# Patient Record
Sex: FEMALE
[De-identification: names, ages, dates, MRNs, and addresses within clinical notes are randomized per-mention and may not be internally consistent; named-entity substitution may affect disease eponyms.]

---

## 2017-02-14 ENCOUNTER — APPOINTMENT (OUTPATIENT)
Dept: ORTHOPEDIC SURGERY | Facility: CLINIC | Age: 37
End: 2017-02-14

## 2017-02-14 VITALS — WEIGHT: 160 LBS | BODY MASS INDEX: 23.7 KG/M2 | HEIGHT: 69 IN

## 2017-02-14 DIAGNOSIS — Z87.09 PERSONAL HISTORY OF OTHER DISEASES OF THE RESPIRATORY SYSTEM: ICD-10-CM

## 2017-02-14 DIAGNOSIS — M94.261 CHONDROMALACIA, RIGHT KNEE: ICD-10-CM

## 2017-02-14 PROBLEM — Z00.00 ENCOUNTER FOR PREVENTIVE HEALTH EXAMINATION: Status: ACTIVE | Noted: 2017-02-14

## 2018-03-23 ENCOUNTER — RESULT REVIEW (OUTPATIENT)
Age: 38
End: 2018-03-23

## 2019-02-12 ENCOUNTER — RECORD ABSTRACTING (OUTPATIENT)
Age: 39
End: 2019-02-12

## 2019-02-12 ENCOUNTER — APPOINTMENT (OUTPATIENT)
Dept: INTERNAL MEDICINE | Facility: CLINIC | Age: 39
End: 2019-02-12
Payer: MEDICARE

## 2019-02-12 VITALS
SYSTOLIC BLOOD PRESSURE: 126 MMHG | BODY MASS INDEX: 27.06 KG/M2 | DIASTOLIC BLOOD PRESSURE: 80 MMHG | WEIGHT: 189 LBS | HEIGHT: 70 IN | TEMPERATURE: 98.6 F | HEART RATE: 68 BPM

## 2019-02-12 DIAGNOSIS — Z83.3 FAMILY HISTORY OF DIABETES MELLITUS: ICD-10-CM

## 2019-02-12 DIAGNOSIS — M46.1 SACROILIITIS, NOT ELSEWHERE CLASSIFIED: ICD-10-CM

## 2019-02-12 DIAGNOSIS — M25.561 PAIN IN RIGHT KNEE: ICD-10-CM

## 2019-02-12 DIAGNOSIS — Z80.9 FAMILY HISTORY OF MALIGNANT NEOPLASM, UNSPECIFIED: ICD-10-CM

## 2019-02-12 DIAGNOSIS — Z86.19 PERSONAL HISTORY OF OTHER INFECTIOUS AND PARASITIC DISEASES: ICD-10-CM

## 2019-02-12 DIAGNOSIS — E78.2 MIXED HYPERLIPIDEMIA: ICD-10-CM

## 2019-02-12 DIAGNOSIS — M12.9 ARTHROPATHY, UNSPECIFIED: ICD-10-CM

## 2019-02-12 DIAGNOSIS — Z82.49 FAMILY HISTORY OF ISCHEMIC HEART DISEASE AND OTHER DISEASES OF THE CIRCULATORY SYSTEM: ICD-10-CM

## 2019-02-12 DIAGNOSIS — Z80.1 FAMILY HISTORY OF MALIGNANT NEOPLASM OF TRACHEA, BRONCHUS AND LUNG: ICD-10-CM

## 2019-02-12 DIAGNOSIS — Z78.9 OTHER SPECIFIED HEALTH STATUS: ICD-10-CM

## 2019-02-12 LAB — CYTOLOGY CVX/VAG DOC THIN PREP: NORMAL

## 2019-02-12 PROCEDURE — 99213 OFFICE O/P EST LOW 20 MIN: CPT

## 2019-02-12 RX ORDER — MELOXICAM 15 MG/1
15 TABLET ORAL DAILY
Refills: 0 | Status: ACTIVE | COMMUNITY

## 2019-02-12 NOTE — PHYSICAL EXAM
[No Acute Distress] : no acute distress [Well Nourished] : well nourished [Supple] : supple [No Lymphadenopathy] : no lymphadenopathy [No Respiratory Distress] : no respiratory distress  [Clear to Auscultation] : lungs were clear to auscultation bilaterally [Normal Rate] : normal rate  [Regular Rhythm] : with a regular rhythm [Normal S1, S2] : normal S1 and S2 [No Edema] : there was no peripheral edema [Grossly Normal Strength/Tone] : grossly normal strength/tone [Normal Gait] : normal gait [No Focal Deficits] : no focal deficits [de-identified] : L SI joint tenderness [de-identified] : neg straight leg raising bilat

## 2019-02-12 NOTE — REVIEW OF SYSTEMS
[Back Pain] : back pain [Negative] : Heme/Lymph [FreeTextEntry9] : L lower  [de-identified] : no radiating pain  through the leg

## 2019-02-12 NOTE — HISTORY OF PRESENT ILLNESS
[FreeTextEntry8] : Here for medical evaluation after her back started to hurt Friday night. She was standing and cheering at a  basketball game and when she sat down her lower back on the left started to hurt. She been taking Aleve and local heat with some improvement but  pain is still present.

## 2019-03-05 ENCOUNTER — RX RENEWAL (OUTPATIENT)
Age: 39
End: 2019-03-05

## 2019-03-11 ENCOUNTER — RX RENEWAL (OUTPATIENT)
Age: 39
End: 2019-03-11

## 2019-04-21 ENCOUNTER — RX RENEWAL (OUTPATIENT)
Age: 39
End: 2019-04-21

## 2019-04-21 RX ORDER — DICLOFENAC SODIUM 10 MG/G
1 GEL TOPICAL
Qty: 200 | Refills: 0 | Status: ACTIVE | COMMUNITY
Start: 2019-02-12 | End: 1900-01-01

## 2019-09-17 ENCOUNTER — RX RENEWAL (OUTPATIENT)
Age: 39
End: 2019-09-17

## 2019-11-27 ENCOUNTER — LABORATORY RESULT (OUTPATIENT)
Age: 39
End: 2019-11-27

## 2019-11-27 ENCOUNTER — APPOINTMENT (OUTPATIENT)
Dept: OBGYN | Facility: CLINIC | Age: 39
End: 2019-11-27
Payer: COMMERCIAL

## 2019-11-27 VITALS
SYSTOLIC BLOOD PRESSURE: 120 MMHG | BODY MASS INDEX: 27.35 KG/M2 | HEIGHT: 70 IN | DIASTOLIC BLOOD PRESSURE: 80 MMHG | WEIGHT: 191 LBS

## 2019-11-27 DIAGNOSIS — R92.2 INCONCLUSIVE MAMMOGRAM: ICD-10-CM

## 2019-11-27 PROCEDURE — 99395 PREV VISIT EST AGE 18-39: CPT

## 2019-12-05 LAB
CYTOLOGY CVX/VAG DOC THIN PREP: NORMAL
HPV HIGH+LOW RISK DNA PNL CVX: DETECTED

## 2020-02-04 ENCOUNTER — RX RENEWAL (OUTPATIENT)
Age: 40
End: 2020-02-04

## 2020-10-14 ENCOUNTER — RX RENEWAL (OUTPATIENT)
Age: 40
End: 2020-10-14

## 2021-06-01 ENCOUNTER — RX RENEWAL (OUTPATIENT)
Age: 41
End: 2021-06-01

## 2022-01-25 ENCOUNTER — APPOINTMENT (OUTPATIENT)
Dept: OBGYN | Facility: CLINIC | Age: 42
End: 2022-01-25
Payer: COMMERCIAL

## 2022-01-25 ENCOUNTER — LABORATORY RESULT (OUTPATIENT)
Age: 42
End: 2022-01-25

## 2022-01-25 VITALS
HEIGHT: 70 IN | BODY MASS INDEX: 27.49 KG/M2 | WEIGHT: 192 LBS | SYSTOLIC BLOOD PRESSURE: 118 MMHG | DIASTOLIC BLOOD PRESSURE: 70 MMHG

## 2022-01-25 PROCEDURE — 99396 PREV VISIT EST AGE 40-64: CPT

## 2022-01-25 NOTE — HISTORY OF PRESENT ILLNESS
[TextBox_4] : 40yo P1 here for annual gyn exam. Reg periods without gyn complaints.  Taking Kariva without issues.\par H/o ASCUS pap /HPV +, COLPOSCOPY-5/2014- nanci 1, Cryosurgery done Repeat pap 8/2015 - normal and 8/2016 neg. Pap 2019- benign, HPV+, 16/18 neg.\par \par She works as a  in [employer]. She is ; has 1 son who is 11yo. They love [place] and plan to go back soon (she is wearing Ariel socks today). \par

## 2022-02-05 LAB
CYTOLOGY CVX/VAG DOC THIN PREP: NORMAL
HPV HIGH+LOW RISK DNA PNL CVX: DETECTED

## 2022-05-27 ENCOUNTER — APPOINTMENT (OUTPATIENT)
Dept: OBGYN | Facility: CLINIC | Age: 42
End: 2022-05-27
Payer: COMMERCIAL

## 2022-05-27 ENCOUNTER — NON-APPOINTMENT (OUTPATIENT)
Age: 42
End: 2022-05-27

## 2022-05-27 VITALS
BODY MASS INDEX: 27.92 KG/M2 | DIASTOLIC BLOOD PRESSURE: 70 MMHG | HEIGHT: 70 IN | WEIGHT: 195 LBS | SYSTOLIC BLOOD PRESSURE: 122 MMHG

## 2022-05-27 PROCEDURE — 57454 BX/CURETT OF CERVIX W/SCOPE: CPT

## 2022-08-01 ENCOUNTER — RESULT CHARGE (OUTPATIENT)
Age: 42
End: 2022-08-01

## 2022-08-01 ENCOUNTER — APPOINTMENT (OUTPATIENT)
Dept: OBGYN | Facility: CLINIC | Age: 42
End: 2022-08-01

## 2022-08-01 VITALS
BODY MASS INDEX: 27.77 KG/M2 | HEIGHT: 70 IN | WEIGHT: 194 LBS | SYSTOLIC BLOOD PRESSURE: 120 MMHG | DIASTOLIC BLOOD PRESSURE: 70 MMHG

## 2022-08-04 ENCOUNTER — APPOINTMENT (OUTPATIENT)
Dept: OBGYN | Facility: CLINIC | Age: 42
End: 2022-08-04

## 2022-08-04 DIAGNOSIS — N87.1 MODERATE CERVICAL DYSPLASIA: ICD-10-CM

## 2022-08-04 PROCEDURE — 57511 CRYOCAUTERY OF CERVIX: CPT

## 2022-08-26 ENCOUNTER — RESULT REVIEW (OUTPATIENT)
Age: 42
End: 2022-08-26

## 2022-09-25 LAB
CORE LAB BIOPSY: NORMAL
HCG UR QL: NEGATIVE
QUALITY CONTROL: YES

## 2023-02-01 ENCOUNTER — RX RENEWAL (OUTPATIENT)
Age: 43
End: 2023-02-01

## 2023-02-17 ENCOUNTER — APPOINTMENT (OUTPATIENT)
Dept: OBGYN | Facility: CLINIC | Age: 43
End: 2023-02-17

## 2023-04-27 ENCOUNTER — RX RENEWAL (OUTPATIENT)
Age: 43
End: 2023-04-27

## 2023-07-25 ENCOUNTER — RX RENEWAL (OUTPATIENT)
Age: 43
End: 2023-07-25

## 2023-10-24 ENCOUNTER — APPOINTMENT (OUTPATIENT)
Dept: OBGYN | Facility: CLINIC | Age: 43
End: 2023-10-24
Payer: COMMERCIAL

## 2023-10-24 ENCOUNTER — LABORATORY RESULT (OUTPATIENT)
Age: 43
End: 2023-10-24

## 2023-10-24 VITALS
WEIGHT: 186 LBS | SYSTOLIC BLOOD PRESSURE: 118 MMHG | HEIGHT: 70 IN | DIASTOLIC BLOOD PRESSURE: 70 MMHG | BODY MASS INDEX: 26.63 KG/M2

## 2023-10-24 DIAGNOSIS — Z01.419 ENCOUNTER FOR GYNECOLOGICAL EXAMINATION (GENERAL) (ROUTINE) W/OUT ABNORMAL FINDINGS: ICD-10-CM

## 2023-10-24 PROCEDURE — 99396 PREV VISIT EST AGE 40-64: CPT

## 2023-10-25 ENCOUNTER — RX RENEWAL (OUTPATIENT)
Age: 43
End: 2023-10-25

## 2023-10-25 RX ORDER — DESOGESTREL/ETHINYL ESTRADIOL AND ETHINYL ESTRADIOL 21-5 (28)
0.15-0.02/0.01 KIT ORAL
Qty: 84 | Refills: 3 | Status: ACTIVE | COMMUNITY
Start: 2019-02-12 | End: 1900-01-01

## 2023-10-30 LAB
CYTOLOGY CVX/VAG DOC THIN PREP: NORMAL
HCG UR QL: NEGATIVE
HPV HIGH+LOW RISK DNA PNL CVX: DETECTED
QUALITY CONTROL: YES

## 2023-12-06 ENCOUNTER — APPOINTMENT (OUTPATIENT)
Dept: OBGYN | Facility: CLINIC | Age: 43
End: 2023-12-06
Payer: COMMERCIAL

## 2023-12-06 VITALS
BODY MASS INDEX: 26.63 KG/M2 | WEIGHT: 186 LBS | SYSTOLIC BLOOD PRESSURE: 118 MMHG | DIASTOLIC BLOOD PRESSURE: 70 MMHG | HEIGHT: 70 IN

## 2023-12-06 DIAGNOSIS — B97.7 PAPILLOMAVIRUS AS THE CAUSE OF DISEASES CLASSIFIED ELSEWHERE: ICD-10-CM

## 2023-12-06 PROCEDURE — 57454 BX/CURETT OF CERVIX W/SCOPE: CPT

## 2024-04-30 DIAGNOSIS — Z00.00 ENCOUNTER FOR GENERAL ADULT MEDICAL EXAMINATION W/OUT ABNORMAL FINDINGS: ICD-10-CM

## 2024-06-13 ENCOUNTER — RESULT REVIEW (OUTPATIENT)
Age: 44
End: 2024-06-13

## 2025-06-25 PROBLEM — Z12.31 SCREENING MAMMOGRAM, ENCOUNTER FOR: Status: ACTIVE | Noted: 2025-06-25

## 2025-09-19 ENCOUNTER — APPOINTMENT (OUTPATIENT)
Dept: OBGYN | Facility: CLINIC | Age: 45
End: 2025-09-19

## 2025-09-26 PROBLEM — Z80.7 FAMILY HISTORY OF LYMPHOMA: Status: ACTIVE | Noted: 2025-09-26

## 2025-09-26 PROBLEM — J45.909 ASTHMA, ALLERGIC: Status: ACTIVE | Noted: 2025-09-26

## 2025-09-26 PROBLEM — Z12.11 COLON CANCER SCREENING: Status: ACTIVE | Noted: 2025-09-26

## 2025-09-26 PROBLEM — Z80.3 FAMILY HISTORY OF MALIGNANT NEOPLASM OF BREAST: Status: ACTIVE | Noted: 2025-09-26
